# Patient Record
Sex: MALE | Race: WHITE | Employment: FULL TIME | URBAN - METROPOLITAN AREA
[De-identification: names, ages, dates, MRNs, and addresses within clinical notes are randomized per-mention and may not be internally consistent; named-entity substitution may affect disease eponyms.]

---

## 2023-01-19 ENCOUNTER — APPOINTMENT (OUTPATIENT)
Dept: GENERAL RADIOLOGY | Age: 48
DRG: 305 | End: 2023-01-19
Payer: COMMERCIAL

## 2023-01-19 ENCOUNTER — APPOINTMENT (OUTPATIENT)
Dept: CT IMAGING | Age: 48
DRG: 305 | End: 2023-01-19
Payer: COMMERCIAL

## 2023-01-19 ENCOUNTER — HOSPITAL ENCOUNTER (INPATIENT)
Age: 48
LOS: 1 days | Discharge: HOME | DRG: 305 | End: 2023-01-20
Attending: EMERGENCY MEDICINE | Admitting: INTERNAL MEDICINE
Payer: COMMERCIAL

## 2023-01-19 DIAGNOSIS — G45.9 TIA (TRANSIENT ISCHEMIC ATTACK): Primary | ICD-10-CM

## 2023-01-19 LAB
A/G RATIO: 1.6 (ref 1.1–2.2)
ALBUMIN SERPL-MCNC: 5.2 G/DL (ref 3.4–5)
ALP BLD-CCNC: 100 U/L (ref 40–129)
ALT SERPL-CCNC: 32 U/L (ref 10–40)
ANION GAP SERPL CALCULATED.3IONS-SCNC: 13 MMOL/L (ref 3–16)
AST SERPL-CCNC: 26 U/L (ref 15–37)
BASOPHILS ABSOLUTE: 0.1 K/UL (ref 0–0.2)
BASOPHILS RELATIVE PERCENT: 1.1 %
BILIRUB SERPL-MCNC: 0.4 MG/DL (ref 0–1)
BUN BLDV-MCNC: 19 MG/DL (ref 7–20)
CALCIUM SERPL-MCNC: 10.2 MG/DL (ref 8.3–10.6)
CHLORIDE BLD-SCNC: 99 MMOL/L (ref 99–110)
CO2: 26 MMOL/L (ref 21–32)
CREAT SERPL-MCNC: 1.2 MG/DL (ref 0.9–1.3)
EOSINOPHILS ABSOLUTE: 0.2 K/UL (ref 0–0.6)
EOSINOPHILS RELATIVE PERCENT: 2.2 %
GFR SERPL CREATININE-BSD FRML MDRD: >60 ML/MIN/{1.73_M2}
GLUCOSE BLD-MCNC: 112 MG/DL (ref 70–99)
HCT VFR BLD CALC: 50 % (ref 40.5–52.5)
HEMOGLOBIN: 17.6 G/DL (ref 13.5–17.5)
INR BLD: 0.92 (ref 0.87–1.14)
LYMPHOCYTES ABSOLUTE: 1.6 K/UL (ref 1–5.1)
LYMPHOCYTES RELATIVE PERCENT: 21.6 %
MCH RBC QN AUTO: 31.5 PG (ref 26–34)
MCHC RBC AUTO-ENTMCNC: 35.2 G/DL (ref 31–36)
MCV RBC AUTO: 89.5 FL (ref 80–100)
MONOCYTES ABSOLUTE: 0.5 K/UL (ref 0–1.3)
MONOCYTES RELATIVE PERCENT: 6.4 %
NEUTROPHILS ABSOLUTE: 5 K/UL (ref 1.7–7.7)
NEUTROPHILS RELATIVE PERCENT: 68.7 %
PDW BLD-RTO: 14 % (ref 12.4–15.4)
PLATELET # BLD: 245 K/UL (ref 135–450)
PMV BLD AUTO: 8.5 FL (ref 5–10.5)
POTASSIUM REFLEX MAGNESIUM: 4 MMOL/L (ref 3.5–5.1)
PRO-BNP: 24 PG/ML (ref 0–124)
PROTHROMBIN TIME: 12.3 SEC (ref 11.7–14.5)
RBC # BLD: 5.59 M/UL (ref 4.2–5.9)
SODIUM BLD-SCNC: 138 MMOL/L (ref 136–145)
TOTAL PROTEIN: 8.5 G/DL (ref 6.4–8.2)
TROPONIN: <0.01 NG/ML
WBC # BLD: 7.3 K/UL (ref 4–11)

## 2023-01-19 PROCEDURE — 93005 ELECTROCARDIOGRAM TRACING: CPT | Performed by: EMERGENCY MEDICINE

## 2023-01-19 PROCEDURE — 71045 X-RAY EXAM CHEST 1 VIEW: CPT

## 2023-01-19 PROCEDURE — 85025 COMPLETE CBC W/AUTO DIFF WBC: CPT

## 2023-01-19 PROCEDURE — 70498 CT ANGIOGRAPHY NECK: CPT

## 2023-01-19 PROCEDURE — 84484 ASSAY OF TROPONIN QUANT: CPT

## 2023-01-19 PROCEDURE — 70450 CT HEAD/BRAIN W/O DYE: CPT

## 2023-01-19 PROCEDURE — 1200000000 HC SEMI PRIVATE

## 2023-01-19 PROCEDURE — 85610 PROTHROMBIN TIME: CPT

## 2023-01-19 PROCEDURE — 80053 COMPREHEN METABOLIC PANEL: CPT

## 2023-01-19 PROCEDURE — 99285 EMERGENCY DEPT VISIT HI MDM: CPT

## 2023-01-19 PROCEDURE — 83880 ASSAY OF NATRIURETIC PEPTIDE: CPT

## 2023-01-19 PROCEDURE — 6360000004 HC RX CONTRAST MEDICATION: Performed by: EMERGENCY MEDICINE

## 2023-01-19 PROCEDURE — 6370000000 HC RX 637 (ALT 250 FOR IP): Performed by: EMERGENCY MEDICINE

## 2023-01-19 RX ORDER — ENOXAPARIN SODIUM 100 MG/ML
40 INJECTION SUBCUTANEOUS DAILY
Status: DISCONTINUED | OUTPATIENT
Start: 2023-01-20 | End: 2023-01-20 | Stop reason: HOSPADM

## 2023-01-19 RX ORDER — ASPIRIN 300 MG/1
300 SUPPOSITORY RECTAL DAILY
Status: DISCONTINUED | OUTPATIENT
Start: 2023-01-20 | End: 2023-01-20 | Stop reason: HOSPADM

## 2023-01-19 RX ORDER — LABETALOL HYDROCHLORIDE 5 MG/ML
10 INJECTION, SOLUTION INTRAVENOUS EVERY 10 MIN PRN
Status: DISCONTINUED | OUTPATIENT
Start: 2023-01-19 | End: 2023-01-20

## 2023-01-19 RX ORDER — ONDANSETRON 4 MG/1
4 TABLET, ORALLY DISINTEGRATING ORAL EVERY 8 HOURS PRN
Status: DISCONTINUED | OUTPATIENT
Start: 2023-01-19 | End: 2023-01-20 | Stop reason: HOSPADM

## 2023-01-19 RX ORDER — ONDANSETRON 2 MG/ML
4 INJECTION INTRAMUSCULAR; INTRAVENOUS EVERY 6 HOURS PRN
Status: DISCONTINUED | OUTPATIENT
Start: 2023-01-19 | End: 2023-01-20 | Stop reason: HOSPADM

## 2023-01-19 RX ORDER — ASPIRIN 81 MG/1
81 TABLET ORAL DAILY
Status: DISCONTINUED | OUTPATIENT
Start: 2023-01-20 | End: 2023-01-20 | Stop reason: HOSPADM

## 2023-01-19 RX ORDER — ASPIRIN 81 MG/1
324 TABLET, CHEWABLE ORAL ONCE
Status: COMPLETED | OUTPATIENT
Start: 2023-01-19 | End: 2023-01-19

## 2023-01-19 RX ORDER — POLYETHYLENE GLYCOL 3350 17 G/17G
17 POWDER, FOR SOLUTION ORAL DAILY PRN
Status: DISCONTINUED | OUTPATIENT
Start: 2023-01-19 | End: 2023-01-20 | Stop reason: HOSPADM

## 2023-01-19 RX ADMIN — IOPAMIDOL 75 ML: 755 INJECTION, SOLUTION INTRAVENOUS at 21:20

## 2023-01-19 RX ADMIN — ASPIRIN 324 MG: 81 TABLET, CHEWABLE ORAL at 22:09

## 2023-01-20 ENCOUNTER — APPOINTMENT (OUTPATIENT)
Dept: MRI IMAGING | Age: 48
DRG: 305 | End: 2023-01-20
Payer: COMMERCIAL

## 2023-01-20 VITALS
DIASTOLIC BLOOD PRESSURE: 103 MMHG | RESPIRATION RATE: 16 BRPM | HEART RATE: 80 BPM | BODY MASS INDEX: 29.34 KG/M2 | HEIGHT: 72 IN | OXYGEN SATURATION: 97 % | SYSTOLIC BLOOD PRESSURE: 160 MMHG | TEMPERATURE: 98.2 F | WEIGHT: 216.6 LBS

## 2023-01-20 LAB
CHOLESTEROL, TOTAL: 239 MG/DL (ref 0–199)
EKG ATRIAL RATE: 66 BPM
EKG ATRIAL RATE: 84 BPM
EKG DIAGNOSIS: NORMAL
EKG DIAGNOSIS: NORMAL
EKG P AXIS: 32 DEGREES
EKG P AXIS: 48 DEGREES
EKG P-R INTERVAL: 116 MS
EKG P-R INTERVAL: 140 MS
EKG Q-T INTERVAL: 370 MS
EKG Q-T INTERVAL: 400 MS
EKG QRS DURATION: 108 MS
EKG QRS DURATION: 92 MS
EKG QTC CALCULATION (BAZETT): 419 MS
EKG QTC CALCULATION (BAZETT): 437 MS
EKG R AXIS: -35 DEGREES
EKG R AXIS: 76 DEGREES
EKG T AXIS: -26 DEGREES
EKG T AXIS: 29 DEGREES
EKG VENTRICULAR RATE: 66 BPM
EKG VENTRICULAR RATE: 84 BPM
HDLC SERPL-MCNC: 32 MG/DL (ref 40–60)
LDL CHOLESTEROL CALCULATED: 165 MG/DL
LV EF: 63 %
LVEF MODALITY: NORMAL
TRIGL SERPL-MCNC: 210 MG/DL (ref 0–150)
VLDLC SERPL CALC-MCNC: 42 MG/DL

## 2023-01-20 PROCEDURE — 92610 EVALUATE SWALLOWING FUNCTION: CPT

## 2023-01-20 PROCEDURE — 93010 ELECTROCARDIOGRAM REPORT: CPT | Performed by: INTERNAL MEDICINE

## 2023-01-20 PROCEDURE — 93306 TTE W/DOPPLER COMPLETE: CPT

## 2023-01-20 PROCEDURE — 6370000000 HC RX 637 (ALT 250 FOR IP): Performed by: INTERNAL MEDICINE

## 2023-01-20 PROCEDURE — 97161 PT EVAL LOW COMPLEX 20 MIN: CPT

## 2023-01-20 PROCEDURE — G0378 HOSPITAL OBSERVATION PER HR: HCPCS

## 2023-01-20 PROCEDURE — 97165 OT EVAL LOW COMPLEX 30 MIN: CPT

## 2023-01-20 PROCEDURE — 36415 COLL VENOUS BLD VENIPUNCTURE: CPT

## 2023-01-20 PROCEDURE — APPNB45 APP NON BILLABLE 31-45 MINUTES

## 2023-01-20 PROCEDURE — 6360000002 HC RX W HCPCS: Performed by: INTERNAL MEDICINE

## 2023-01-20 PROCEDURE — 70551 MRI BRAIN STEM W/O DYE: CPT

## 2023-01-20 PROCEDURE — 96372 THER/PROPH/DIAG INJ SC/IM: CPT

## 2023-01-20 PROCEDURE — 93005 ELECTROCARDIOGRAM TRACING: CPT | Performed by: INTERNAL MEDICINE

## 2023-01-20 PROCEDURE — 80061 LIPID PANEL: CPT

## 2023-01-20 PROCEDURE — 83036 HEMOGLOBIN GLYCOSYLATED A1C: CPT

## 2023-01-20 RX ORDER — LISINOPRIL 10 MG/1
10 TABLET ORAL DAILY
Qty: 30 TABLET | Refills: 3 | Status: SHIPPED | OUTPATIENT
Start: 2023-01-21

## 2023-01-20 RX ORDER — LABETALOL HYDROCHLORIDE 5 MG/ML
10 INJECTION, SOLUTION INTRAVENOUS EVERY 4 HOURS PRN
Status: DISCONTINUED | OUTPATIENT
Start: 2023-01-20 | End: 2023-01-20 | Stop reason: HOSPADM

## 2023-01-20 RX ORDER — ATORVASTATIN CALCIUM 40 MG/1
40 TABLET, FILM COATED ORAL NIGHTLY
Qty: 30 TABLET | Refills: 3 | Status: SHIPPED | OUTPATIENT
Start: 2023-01-20

## 2023-01-20 RX ORDER — ATORVASTATIN CALCIUM 80 MG/1
80 TABLET, FILM COATED ORAL NIGHTLY
Status: DISCONTINUED | OUTPATIENT
Start: 2023-01-20 | End: 2023-01-20 | Stop reason: HOSPADM

## 2023-01-20 RX ORDER — ASPIRIN 81 MG/1
81 TABLET ORAL DAILY
Qty: 30 TABLET | Refills: 3 | Status: SHIPPED | OUTPATIENT
Start: 2023-01-21

## 2023-01-20 RX ORDER — ATORVASTATIN CALCIUM 40 MG/1
40 TABLET, FILM COATED ORAL NIGHTLY
Status: DISCONTINUED | OUTPATIENT
Start: 2023-01-20 | End: 2023-01-20

## 2023-01-20 RX ORDER — LISINOPRIL 10 MG/1
10 TABLET ORAL DAILY
Status: DISCONTINUED | OUTPATIENT
Start: 2023-01-20 | End: 2023-01-20 | Stop reason: HOSPADM

## 2023-01-20 RX ADMIN — LISINOPRIL 10 MG: 10 TABLET ORAL at 10:29

## 2023-01-20 RX ADMIN — ENOXAPARIN SODIUM 40 MG: 100 INJECTION SUBCUTANEOUS at 10:25

## 2023-01-20 RX ADMIN — ASPIRIN 81 MG: 81 TABLET, COATED ORAL at 10:25

## 2023-01-20 NOTE — DISCHARGE INSTRUCTIONS
Secondary Stroke Prevention Goals:      Blood Pressure:  Goal - BP < 140/90  Take your medication as prescribed. Take your blood pressure at home regularly (at least once a day for the first few weeks). Write the numbers down so your primary care provider can adjust medications as needed. Cholesterol:   Continue to take your cholesterol medication to help prevent a stroke. Smoking Cessation   If you are a current smoker the goal is completely quitting. Diabetes Mellitus:   Goal - HbA1c < 7%  Visit this web page for more information on managing diabetes:   ElectionBooks[employer]fi     Alcohol Consumption:  Men - two or fewer drinks per day  Women - one or fewer drinks per day     Physical Activity:  Goal - at least 30 minutes of moderate intensity physical exercise 1-3 times per week  Okay to start at any level and work your way up to goal. Walking even 5-10 minutes a day is better than no walking. Starting with small goals and working your way up is the best way to be successful. Visit this web page for more information on moving more and living healthy:   Rajinder.es     Diet:  Low-fat, low-sodium, and Mediterranean or Dietary Approaches to Stop Hypertension (DASH) or Diabetic Diet when applicable.    Visit this web page for more information on ways to improve your diet:   http://www.Clout.com/     Obesity:  Goal BMI 18.5-25 kg/m2

## 2023-01-20 NOTE — PROGRESS NOTES
Occupational Therapy  Facility/Department: [facility]  Occupational Therapy Initial Assessment    Name: Loretta Khan  : 1975  MRN: [id number]  Date of Service: 2023    Discharge Recommendations:     OT Equipment Recommendations  Equipment Needed: No   Loretta Khan scored a 22/24 on the AM-[facility] ADL Inpatient form. At this time, no further OT is recommended upon discharge. Recommend patient returns to prior setting with prior services. Patient Diagnosis(es): The encounter diagnosis was TIA (transient ischemic attack). Past Medical History:  has no past medical history on file. Past Surgical History:  has no past surgical history on file. Treatment Diagnosis: decreased ADLs and transfers      Assessment   Performance deficits / Impairments: Decreased functional mobility ; Decreased high-level IADLs;Decreased endurance  Assessment: Prior to admission pt was living at home with his wife, was independent with ADLs and IADLs. Pt now presents s/p dysarthria, however reporting symptoms have now resolved. Pt demonstrating independent with mobility and transfers. Pt plans to discharge home with wife to assist. No further acute OT needs are identified, will sign off on OT. Treatment Diagnosis: decreased ADLs and transfers  Prognosis: Fair  Decision Making: Medium Complexity  REQUIRES OT FOLLOW-UP: No  Activity Tolerance  Activity Tolerance: Patient Tolerated treatment well        Plan   Occupational Therapy Plan  Times Per Week: eval and dc     Restrictions  Position Activity Restriction  Other position/activity restrictions: up as tolerated    Subjective   General  Chart Reviewed: Yes  Patient assessed for rehabilitation services?: Yes  Additional Pertinent Hx: Pt admitted to ED with c/o dysarthria. MRI: no acute findings. Minimal punctate subcortical white matter signal abnormalities in the frontal lobes.  These are nonspecific and commonly associated with minimal chronic small vessel ischemic disease and some short minimal age-related degenerative change. Pts symptoms had resolved after admitting to ED, no TKN was given. Being followed by Neuro. No medical history on file. Family / Caregiver Present: No  Referring Practitioner: Ramón Knox MD  Diagnosis: TIA  Subjective  Subjective: Pt seated in bed upon arrival, agreeable to OT eval and treat. pt denies  Social/Functional History  Social/Functional History  Lives With: Spouse  Type of Home: House  Home Layout: Two level  Home Access: Stairs to enter with rails  Entrance Stairs - Number of Steps: 4  Entrance Stairs - Rails: Both  Bathroom Shower/Tub: Tub/Shower unit  Bathroom Toilet: Standard  Bathroom Equipment: None  Home Equipment:  (no AE)  ADL Assistance: Independent  Homemaking Assistance: Independent  Ambulation Assistance: Independent  Transfer Assistance: Independent  Active : Yes  Type of Occupation: pt works in   Additional Comments: Wife available to assist PRN       Objective              Safety Devices  Type of Devices: Call light within reach; Bed alarm in place  Bed Mobility Training  Bed Mobility Training: Yes  Sit to Supine: Independent  Scooting: Independent  Balance  Sitting: Intact  Standing: Intact  Transfer Training  Transfer Training: Yes  Sit to Stand: Independent  Stand to Sit: Independent  Gait Training  Gait Training: Yes  Gait  Overall Level of Assistance: Independent (pt amb 400 ft without device IND. steady with no LOB.)  Rail Use:  (no rail for ascecnt, right rail for descent)  Stairs - Level of Assistance: Independent  Number of Stairs Trained: 10        ADL  LE Dressing: Supervision  LE Dressing Skilled Clinical Factors: standing on one foot to thread LEs- ed pt to sit to don socks and pants- verb understanding     Activity Tolerance  Activity Tolerance: Patient tolerated evaluation without incident     Transfers  Sit to stand: Independent  Stand to sit:  Independent  Transfer Comments: mobility in room and hallway independently, no LOB. No AE. Up/ down stairs independently  Vision  Vision: Within Functional Limits  Hearing  Hearing: Within functional limits  Cognition  Overall Cognitive Status: WFL  Orientation  Overall Orientation Status: Within Functional Limits                  Education Given To: Patient  Education Provided: Role of Therapy;Plan of Care  Education Method: Verbal  Barriers to Learning: None  Education Outcome: Verbalized understanding                     AM-PAC Score        AM-PAC Inpatient Daily Activity Raw Score: 22 (01/20/23 1107)  AM-PAC Inpatient ADL T-Scale Score : 47.1 (01/20/23 1107)  ADL Inpatient CMS 0-100% Score: 25.8 (01/20/23 1107)  ADL Inpatient CMS G-Code Modifier : Gearl Tho (01/20/23 1107)         Goals  Patient Goals   Patient goals : go home       Therapy Time   Individual Concurrent Group Co-treatment   Time In 0840         Time Out 0855         Minutes 15         Timed Code Treatment Minutes: 0 Minutes (15 min eval only)   Geraldine Juarez.  [address] [facility], OTR/L [id number]

## 2023-01-20 NOTE — PROGRESS NOTES
Speech Language Pathology  Facility/Department: [facility] 5T ORTHO/NEURO   CLINICAL BEDSIDE SWALLOW EVALUATION/Discharge    NAME: Kateryna Pang  : 1975  MRN: [id number]    ADMISSION DATE: 2023  ADMITTING DIAGNOSIS: has TIA (transient ischemic attack) on their problem list.  ONSET DATE: 23    Recent Chest Xray/CT of Chest:   Impression       No acute pulmonary disease. MRI Brain:   Impression       1. No acute intracranial abnormality. No evidence of acute infarct. 2. Minimal punctate subcortical white matter signal abnormalities in the frontal lobes. These are nonspecific and commonly associated with minimal chronic small vessel ischemic disease and some short minimal age-related degenerative change. Similar    findings are also be seen with migraine headaches. Date of Eval: 2023  Evaluating Therapist: LUANNE Toney    Current Diet level:  Current Diet : Regular      Primary Complaint  Patient Complaint: none    Pain:  Pain Assessment  Pain Assessment: None - Denies Pain    Reason for Referral  Kateryna Pang was referred for a bedside swallow evaluation to assess the efficiency of his swallow function, identify signs and symptoms of aspiration and make recommendations regarding safe dietary consistencies, effective compensatory strategies, and safe eating environment. Impression  Dysphagia Diagnosis: Swallow function appears WFL  Dysphagia Impression : Pt presents with swallow function WFL. Pt seated upright in bed and readily self fed all trials of PO. Pt demonstrated timely and complete mastication with no oral residue remaining. Pt appeared to swallow all trials with no overt s/s penetration/aspiration. No wet vocal quality and no reported sensation globus. Based on assessment SLP recommends continue current diet of Regular Solids and Thin Liquids. No further SLP services warranted at this time.  Please re-consult as needed/appropriate. Dysphagia Outcome Severity Scale: Level 7: Normal in all situations     Speech Screen:  Pt with initial difficulty with word finding skills upon admission, however since have resolved and no complaints at this time. Adequate speech/language/cognitive skills noted during assessment. Treatment Plan  Requires SLP Intervention: No     D/C Recommendations: No follow up therapy recommended post discharge       Recommended Diet and Intervention  Recommended Diet: Regular Solids, Thin Liquids  Recommended Form of Meds: PO     Compensatory Swallowing Strategies  Compensatory Swallowing Strategies : Upright as possible for all oral intake    General  Chart Reviewed: Yes  Subjective  Subjective: Pt seated upright in bed agreeable to evaluation at this time. Pt's wife present. Behavior/Cognition: Alert; Cooperative;Pleasant mood  Respiratory Status: Room air  O2 Device: None (Room air)  Communication Observation: Functional  Follows Directions: Complex  Dentition: Adequate  Patient Positioning: Upright in bed  Baseline Vocal Quality: Normal  Volitional Cough: Strong  Prior Dysphagia History: none  Consistencies Administered: Ice Chips; Thin - straw; Thin - cup;Pureed;Regular      Vision/Hearing  Vision  Vision: Within Functional Limits  Hearing  Hearing: Within functional limits    Oral Motor Deficits  Oral/Motor  Oral Hygiene: Moist;Clean    Oral Phase Dysfunction  Oral Phase  Oral Phase: WNL     Indicators of Pharyngeal Phase Dysfunction   Pharyngeal Phase   Pharyngeal Phase: Presumed WFL    Prognosis  Individuals consulted  Consulted and agree with results and recommendations: Patient;RN  RN Name: Charis Shaffer    Education  Patient Education: Discussed rationale for evaluation, results and recommendations. Patient Education Response: Verbalizes understanding;Demonstrated understanding  Safety Devices in place: Yes  Type of devices:  All fall risk precautions in place       Pt's goal:  To go home    Plan:  Discharge from SLP services. No further intervention required at this time. Please re-consult as needed/appropriate and/or if s/s penetration/aspiration emerge. Recommended diet: Regular Solids, Thin Liquids  -meds PO  Discharge Plan: To be determined closer to discharge  Discussed with RN: Adrianne Fuentes  Needs within reach.        Therapy Time  SLP Individual Minutes  Time In: 0900  Time Out: 3205  Minutes: 15     SLP Total Treatment Time  Total Treatment Time: 15      Electronically signed by:  Julee Newman M.A., [address] [place] [address]  Speech-Language Pathologist  Pg #: [phone]

## 2023-01-20 NOTE — PROGRESS NOTES
Transferred to 5526 @ [phone] 23    Aox4  No difficulty with words or aphasia    NIHSS score 0  No deficits, no one sided weakness    Ambulatory without assistive devices    Voiding    Passed swallow screen    RA  Clear lung sounds    NSR on tele    MRI checklist complete, pt will get MRI tonight    Stroke education provided to pt and spouse    Standard precautions implemented, will continue to monitor

## 2023-01-20 NOTE — PROGRESS NOTES
Clinical Pharmacy Progress Note    All IVs in NS - Management by Pharmacy    Consult Date(s): 1/19/23  Consulting Provider(s): Dr America Faustin / Plan  Concern for Neurologic Injury - All IVs in Normal Saline  Drips will be adjusted to normal saline as appropriate based on compatibility, in an effort to avoid fluid shifts, as D5W is osmotically active. The following intermittent IV drips / infusions have been adjusted to saline:  None ordered at this time. If ordered, the following medications must remain in D5W due to incompatibility with normal saline:  Amiodarone Infusion  Amphotericin  Mycophenolate  Nitroprusside  Penicillin G Potassium  Note: Patient may have dextrose ordered as part of hypoglycemia treatment protocol. Total IV fluid delivered to patient over last 24 hrs: N/A  Pharmacist will follow daily to ensure all IVPBs / drips are in NS where possible. Thank you for consulting Pharmacy!     Maicol Hester, PharmD, [facility]  [id number]  01/20/23 9:03 AM

## 2023-01-20 NOTE — CONSULTS
Neurology / Neurocritical Care Consult Note    Nori Yao MD is requesting this consult. Reason for Consult: word finding difficulty and hypertensive urgency  Admission Chief Complaint: word finding difficulty    History of Present Illness     Kal Benavides is a 41 y.o. y/o male with PMH significant for smoking (30 pack years) HLD and HTN, but does not take any medications and has not seen his PCP in 10 years. Per my interview with the patient he was at work and noticed he was having trouble talking to one of his co-workers at [employer] on 1/19/23. As the patient continued to converse with his co-workers the group noticed his speech became worse. Patient states he could understand other people, and knew what he wanted to say, but could not get the words out. Patient came to [facility] ED for evaluation and treatment. On arrival to the ED, patient's symptoms began to lessen. A code stroke was called CT head no acute intracranial abnormality. CTA head/neck showed no aneurysms, occlusions, or significant stenosis.  stroke team recommended against TNK, and allow BP autoregulation overnight. Patient admitted to  for further evaluation and treatment. REVIEW OF SYSTEMS:   Constitutional- No weight loss or fevers   Eyes- No diplopia. No photophobia. Ears/nose/throat- No dysphagia. No Dysarthria   Cardiovascular- No palpitations. No chest pain   Respiratory- No dyspnea. No Cough   Gastrointestinal- No Abdominal pain. No Vomiting. Genitourinary- No incontinence. No urinary retention   Musculoskeletal- No myalgia. No arthralgia   Skin- No rash. No easy bruising. Psychiatric- No depression. No anxiety   Endocrine- No diabetes. No thyroid issues. Hematologic- No bleeding difficulty. No fatigue   Neurologic- No headaches. No vision changes. No focal motor/sensory deficits.      Past Medical, Surgical, Family, and Social History   PAST MEDICAL HISTORY:  No past medical history on file.  SURGICAL HISTORY:  No past surgical history on file. FAMILY HISTORY & SOCIAL HISTORY:  Family history non-contributory  No family history on file. Social History     Tobacco Use    Smoking status: Every Day     Packs/day: 1.00     Types: Cigarettes    Smokeless tobacco: Never   Substance Use Topics    Alcohol use: Yes     Comment: occ    Drug use: Never         Allergies & Outpatient Medications   ALLERGIES:  Allergies   Allergen Reactions    Pcn [Penicillins] Anaphylaxis     HOME MEDICATIONS:  There are no discharge medications for this patient. Physical Exam   PHYSICAL EXAM:  Vitals:    01/19/23 2230 01/19/23 2300 01/19/23 2341 01/20/23 0207   BP: (!) 186/118 (!) 179/110 (!) 185/116 (!) 170/107   Pulse: 90 80 84 80   Resp: 16 21 18 16   Temp:   98.1 °F (36.7 °C) 98.2 °F (36.8 °C)   TempSrc:   Oral Oral   SpO2: 97% 96% 98% 95%   Weight:       Height:             General: Alert, no distress, well-nourished  Neurologic  Mental status:   orientation to person, place, time, situation   Attention intact as able to attend well to the exam     Language fluent in conversation   Comprehension intact; follows simple commands    Cranial nerves:   CN2: Visual fields full w/o extinction on confrontational testing   CN 3,4,6: Pupils equal and reactive to light, extraocular muscles intact  CN5: Facial sensation symmetric   CN7: Face symmetric  CN8: Hearing symmetric to spoken voice  CN9: Palate elevated symmetrically  CN11: Traps full strength on shoulder shrug  CN12: Tongue midline with protrusion    Motor Exam:   R  L    Deltoid 5  5   Biceps 5 5   Triceps 5 5   Wrist extension  5 5   Interossei 5 5      R  L    Hip flexion  5  5   Hip extension  5 5   Knee flexion  5 5   Knee extension  5 5   Ankle dorsiflexion  5 5   Ankle plantar flexion  5 5     Deep tendon reflexes:    R  L    Biceps  2  2   Brachioradialis  2 2   Patellar  2 2     Sensory: light touch intact and symmetric in all 4 extremities.   No sensory extinction on bilateral simultaneous stimulation  Cerebellar/coordination: finger nose finger normal without ataxia  Tone: normal in all 4 extremities  Gait: normal      OTHER SYSTEMS:  Cardiovascular: Warm, appears well perfused   Respiratory: Easy, non-labored respiratory pattern   Abdominal: Abdomen is without distention   Extremities: Upper and lower extremities are atraumatic in appearance without deformity. No swelling or erythema. Psychiatric: Cooperative with exam    Diagnostic Testing Results   IMAGES:  Images personally reviewed and agree w/ radiology interpretation. Head CT w/o Contrast:  Impression   1. No acute intracranial abnormality by CT criteria. 2.  Borderline low lying cerebral tonsils not meeting criteria for Chiari malformation. CTA of Head / Neck w/ Contrast:  Impression   1. No aneurysms, vascular occlusions, or intracranial stenoses identified. 2.  No significant stenosis in the extracranial vertebral or carotid arteries. MRI Brain w/o Contrast:  Official read pending. Initial review by this NP shows no signs of stroke or other intracranial abnormality. LABS:  All results below personally reviewed. Pertinent positives & negatives are addressed in Impression & Recommendations below. LABS   Metabolic Panel Recent Labs     01/19/23  2109      K 4.0   CL 99   CO2 26   BUN 19   CREATININE 1.2   GLUCOSE 112*   CALCIUM 10.2   LABALBU 5.2*   ALKPHOS 100   ALT 32   AST 26      CBC / Coags Recent Labs     01/19/23  2109   WBC 7.3   RBC 5.59   HGB 17.6*   HCT 50.0      INR 0.92      Other No results for input(s): LABA1C, LDLCALC, TRIG, TSH, [id number], FOLATE, LABSALI, COVID19 in the last 72 hours. No results for input(s): PHENYTOIN, KEPPRA, LACOSA, LAMO, VALPROATE, LACTSEPSIS, LACTA in the last 72 hours.        CURRENT SCHEDULED MEDICATIONS   Inpatient Medications     enoxaparin, 40 mg, SubCUTAneous, Daily    aspirin, 81 mg, Oral, Daily **OR** aspirin, 300 mg, Rectal, Daily   Infusions      Antibiotics   Recent Abx Admin        No antibiotic orders with administrations found. IMPRESSION & RECOMMENDATIONS     IMPRESSION:  Anuja Singletary is a 42 yo male who presented to [facility] ED for expressive aphasia. Patient's symptoms began to resolve in the ED and on exam, patient had no aphasia or other neurological deficit. CT head and CTA head/neck showed no acute intracranial abnormality.   MRI shows no stroke or other intracranial abnormality on review by this NP.       RECOMMENDATIONS:  -MRI of the brain w/o contrast to eval for stroke completed  -Check lipid panel and hemoglobin A1C if not already completed     Medications:  -High-intensity statin   -Start ASA 81mg   -SCDs for DVT prophylaxis  -Ok to start DVT chemoprophylaxis     Consults / [address] elevated to help prevent aspiration  -Q4H Neurologic Exams & Vitals  -NIHSS per guidelines   -Telemetry   -PT/OT: eval and treat  -PMR consult if rehab recommended   -ST: eval and treat and dysphagia screen  -Nursing bedside swallow prior to any PO intake   -Blood Pressure Management   -SBP <160mmHg    Follow up / Discharge Recommendations:  -Stroke Education at Discharge    -Follow up w/ Neurology in 3 months         Angele Aschoff, APRN - CNP   Neurology & Neurocritical Care   1/20/2023 5:21 AM      Floor / PCU Patients:  Neurology Line: [phone]  PerfectServe: [facility] Neurology

## 2023-01-20 NOTE — PROGRESS NOTES
Physical Therapy  Facility/Department: [facility]  Physical Therapy Initial Assessment/Discharge    Name: Floyd Gant  : 1975  MRN: [id number]  Date of Service: 2023    Discharge Recommendations:    Floyd Gant scored a 24/24 on the AM-PAC short mobility form. At this time, no further PT is recommended upon discharge. Recommend patient returns to prior setting with prior services. PT Equipment Recommendations  Equipment Needed: No      Patient Diagnosis(es): The encounter diagnosis was TIA (transient ischemic attack). Past Medical History:  has no past medical history on file. Past Surgical History:  has no past surgical history on file. Assessment   Assessment: Pt is independent with all functional mobility. Safe to go home upon D/C. No further inpt PT indicated. D/C PT. Decision Making: Low Complexity  Requires PT Follow-Up: No  Activity Tolerance  Activity Tolerance: Patient tolerated evaluation without incident     Plan   Physcial Therapy Plan  General Plan: Discharge with evaluation only  Safety Devices  Type of Devices: Call light within reach, Bed alarm in place, Left in bed     Restrictions  Position Activity Restriction  Other position/activity restrictions: up as tolerated     Subjective   Pain: pt denies  General  Chart Reviewed: Yes  Patient assessed for rehabilitation services?: Yes  Additional Pertinent Hx: Floyd Gant is a 41 y.o. male who presents to the emergency department with difficulty with speech. Now resolved. Family / Caregiver Present: Yes (wife)  Referring Practitioner: Adarsh Vasquez  Referral Date : 23  Follows Commands: Within Functional Limits  Subjective  Subjective: Pt supine in bed & agreeable to PT.          Social/Functional History  Social/Functional History  Lives With: Spouse  Type of Home: House  Home Layout: Two level  Home Access: Stairs to enter with rails  Entrance Stairs - Number of Steps: 4  Entrance Stairs - Rails: Both  Bathroom Shower/Tub: Tub/Shower unit  Bathroom Toilet: Standard  Bathroom Equipment: None  Home Equipment:  (no AE)  ADL Assistance: Independent  Homemaking Assistance: Independent  Ambulation Assistance: Independent  Transfer Assistance: Independent  Active : Yes  Type of Occupation: pt works in   Additional Comments: Wife available to assist PRN  Vision/Hearing  Vision  Vision: Within Functional Limits  Hearing  Hearing: Within functional limits    Cognition   Orientation  Overall Orientation Status: Within Functional Limits  Cognition  Overall Cognitive Status: WFL     Objective   Heart Rate: 80  Heart Rate Source: Monitor  BP: (!) 160/103  BP Location: Right upper arm  BP Method: Automatic  Patient Position: Semi fowlers  MAP (Calculated): 122  Resp: 16  SpO2: 97 %  O2 Device: None (Room air)              AROM RLE (degrees)  RLE AROM: WNL  AROM LLE (degrees)  LLE AROM : WNL  Strength RLE  Strength RLE: WFL  Strength LLE  Strength LLE: WFL        Bed Mobility Training  Bed Mobility Training: Yes  Sit to Supine: Independent  Scooting: Independent  Balance  Sitting: Intact  Standing: Intact  Transfer Training  Transfer Training: Yes  Sit to Stand: Independent  Stand to Sit: Independent  Gait Training  Gait Training: Yes  Gait  Overall Level of Assistance: Independent (pt amb 400 ft without device IND.  steady with no LOB.)  Rail Use:  (no rail for ascecnt, right rail for descent)  Stairs - Level of Assistance: Independent  Number of Stairs Trained: 10                                                                      AM-PAC Score  AM-PAC Inpatient Mobility Raw Score : 24 (01/20/23 1053)  AM-PAC Inpatient T-Scale Score : 61.14 (01/20/23 1053)  Mobility Inpatient CMS 0-100% Score: 0 (01/20/23 1053)  Mobility Inpatient CMS G-Code Modifier : [address] (01/20/23 1053)                 Education  Patient Education  Education Given To: Patient  Education Provided: Role of Therapy  Education Method: Verbal  Barriers to Learning: None  Education Outcome: Verbalized understanding      Therapy Time   Individual Concurrent Group Co-treatment   Time In 0840         Time Out 0855         Minutes Yolis 9082, PT

## 2023-01-20 NOTE — DISCHARGE SUMMARY
Hospital Medicine Discharge Summary    Patient ID: Ayla Mahmood      Patient's PCP: Akin Richey Petit Date: 1/19/2023     Discharge Date:   1/20/2023    Admitting Provider: Yadira Thompson MD     Discharge Provider: Loma Lennox, MD     Discharge Diagnoses: Transient ischemic attack       Active Hospital Problems    Diagnosis     TIA (transient ischemic attack) [G45.9]      Priority: Medium       The patient was seen and examined on day of discharge and this discharge summary is in conjunction with any daily progress note from day of discharge. Hospital Course: 47-year-old  male with medical history significant for active smoking, hyperlipidemia and hypertension who was was admitted with expressive aphasia 1/19/2023. His CT his brain and CTA head and neck were unremarkable.  stroke team recommended against TNK. His MRI brain was also unremarkable. He has a history of hypertension but he was not taking any medication. The detailed problems based assessment and plan on the day of discharge was follow:     1. TIA with expressive aphasia. Symptoms lasted for about 2 hours. CT head and CTA head and neck's without any acute abnormalities. MRI brain was also unremarkable. Appreciate neurology input. Continue aspirin 81 mg and Lipitor 40 mg. Follow-up lipid panel and A1c. Transthoracic echo was normal.   Consider Even Monitor placement as out patient   Neurology are planning to follow up in 3 months      2. Hypertension, noncompliant  Start lisinopril 10 mg daily. Recommended to check blood pressure at home and follow-up with PCP. 3.  Occasional PVC  Repeat EKG with LVH and PVCs  Transthoracic echo was normal      4. History of hyperlipidemia  Follow-up lipid panel    5. Tobacco dependency  Reports smoking 1 pack/day. Counseled for cessation. Offered nicotine replacement but declined.          Physical Exam Performed:     BP (!) 160/103   Pulse 80   Temp 98.2 °F (36.8 °C) (Oral)   Resp 16   Ht 6' (1.829 m)   Wt 216 lb 9.6 oz (98.2 kg)   SpO2 97%   BMI 29.38 kg/m²       General appearance:  No apparent distress, appears stated age and cooperative. HEENT:  Normal cephalic, atraumatic without obvious deformity. Pupils equal, round, and reactive to light. Extra ocular muscles intact. Conjunctivae/corneas clear. Neck: Supple, with full range of motion. No jugular venous distention. Trachea midline. Respiratory:  Normal respiratory effort. Clear to auscultation, bilaterally without Rales/Wheezes/Rhonchi. Cardiovascular:  Regular rate and rhythm with normal S1/S2 without murmurs, rubs or gallops. Abdomen: Soft, non-tender, non-distended with normal bowel sounds. Musculoskeletal:  No clubbing, cyanosis or edema bilaterally. Full range of motion without deformity. Skin: Skin color, texture, turgor normal.  No rashes or lesions. Neurologic:  Neurovascularly intact without any focal sensory/motor deficits. Cranial nerves: II-XII intact, grossly non-focal.  Psychiatric:  Alert and oriented, thought content appropriate, normal insight  Capillary Refill: Brisk,< 3 seconds   Peripheral Pulses: +2 palpable, equal bilaterally       Labs: For convenience and continuity at follow-up the following most recent labs are provided:      CBC:    Lab Results   Component Value Date/Time    WBC 7.3 01/19/2023 09:09 PM    HGB 17.6 01/19/2023 09:09 PM    HCT 50.0 01/19/2023 09:09 PM     01/19/2023 09:09 PM       Renal:    Lab Results   Component Value Date/Time     01/19/2023 09:09 PM    K 4.0 01/19/2023 09:09 PM    CL 99 01/19/2023 09:09 PM    CO2 26 01/19/2023 09:09 PM    BUN 19 01/19/2023 09:09 PM    CREATININE 1.2 01/19/2023 09:09 PM    CALCIUM 10.2 01/19/2023 09:09 PM         Significant Diagnostic Studies    Radiology:   MRI brain without contrast   Final Result      1. No acute intracranial abnormality. No evidence of acute infarct.       2. Minimal punctate subcortical white matter signal abnormalities in the frontal lobes. These are nonspecific and commonly associated with minimal chronic small vessel ischemic disease and some short minimal age-related degenerative change. Similar    findings are also be seen with migraine headaches. CTA HEAD NECK W CONTRAST   Final Result      1. No aneurysms, vascular occlusions, or intracranial stenoses identified. 2.  No significant stenosis in the extracranial vertebral or carotid arteries. CT HEAD WO CONTRAST   Final Result      1. No acute intracranial abnormality by CT criteria. 2.  Borderline low lying cerebral tonsils not meeting criteria for Chiari malformation. Discussed with Dr. Dennise Coffman at 9:24 PM.      XR CHEST PORTABLE   Final Result      No acute pulmonary disease. Consults:     IP CONSULT TO PHARMACY  PHARMACY TO CHANGE BASE FLUIDS  IP CONSULT TO STROKE TEAM  IP CONSULT TO HOSPITALIST  IP CONSULT TO NEUROLOGY    Disposition:  home     Condition at Discharge: Stable    Discharge Instructions/Follow-up:  Follow up with PCP to check BP,follow up on lipid panel and A1C . Consider Event Monitor placement as outpatient   Follow up with neurology     Code Status:  Full Code     Activity: activity as tolerated    Diet: low fat, low cholesterol diet      Discharge Medications:     Current Discharge Medication List             Details   aspirin 81 MG EC tablet Take 1 tablet by mouth daily  Qty: 30 tablet, Refills: 3      atorvastatin (LIPITOR) 40 MG tablet Take 1 tablet by mouth nightly  Qty: 30 tablet, Refills: 3      lisinopril (PRINIVIL;ZESTRIL) 10 MG tablet Take 1 tablet by mouth daily  Qty: 30 tablet, Refills: 3             Time Spent on discharge: 34 in the examination, evaluation, counseling and review of medications and discharge plan. Signed:    Goldy Gonzalez MD   1/20/2023      Thank you DANNY Caceres for the opportunity to be involved in this patient's care.  If you have any questions or concerns, please feel free to contact me at [phone].

## 2023-01-20 NOTE — ED PROVIDER NOTES
[address] [facility] ATTENDING NOTE       Date of evaluation: 1/19/2023    Chief Complaint     Aphasia (Patient comes to the ED today for difficulty in his speech. Patient was at work when he noticed that his speech was abnormal. )      History of Present Illness     Robert Silva is a 41 y.o. male who presents to the emergency department with difficulty with speech. Patient has no documented past medical history though he states that he was told once he has hypertension and high cholesterol currently not on any medication for either. He now presents with difficulty with speech and word finding. This occurred at approximately 7 PM (1 hour and 45 minutes prior to arrival). Patient states he was at work talking to coworkers when he started to have difficulty saying common words and finding the right words to describe very common tasks for his work. He denies any headache, changes in vision or difficulty swallowing. No weakness in his extremities or change in sensation in extremities. No recent falls or trauma. Patient is not on any anticoagulation. Patient otherwise denies any recent fever, cough, shortness of breath, chest pain, abdominal pain, nausea, vomiting or changes in bladder or bowel function. Review of Systems     Positive: Word finding difficulty, slurred speech    Negative: Fever, cough, shortness of breath, chest pain, abdominal pain, nausea, vomiting, change in taste, change in smell, changes in bladder or bowel function  See HPI. A complete review of systems wasconducted and is otherwise negative unless noted above. Past Medical, Surgical, Family, and Social History     He has no past medical history on file. He has no past surgical history on file. His family history is not on file. He reports that he has been smoking cigarettes. He has been smoking an average of 1 pack per day.  He has never used smokeless tobacco. He reports current alcohol use. He reports that he does not use drugs. Medications     Previous Medications    No medications on file       Allergies     He is allergic to pcn [penicillins]. Physical Exam     INITIAL VITALS: BP: (!) 229/126, Temp: 98.2 °F (36.8 °C), Heart Rate: (!) 104, Resp: 18, SpO2: 100 %   Physical Exam    General:  Well developed adult male in no acute distress, able toconverse in full sentences  HEENT:  Normocephalic, atraumatic, PERRL, extra-ocular movements intact, oropharynx benign  Neck:  Supple, no lymphadenopathy, trachea midline, no masses  Pulmonary:   Clear to auscultation bilaterally, good air movement, no wheezes  Cardiac:  Regular rate and rhythm, no M/R/G  Abdomen:  Soft, non-tender, non-distended, no rebounding or guarding  Musculoskeletal:  2+ pulses, no edema or clubbing  Skin:  No concerning rashes or lesions, no cyanosis  Neuro: Alert and oriented X 4,able to move all four extremities with equal strength bilaterally, sensory exam grossly intact, cranial nerves II-XII intact other than occasional word finding difficulty and slurred speech though not present for its entirety  Psych:  Appropriate mood and affect    NIH Stroke Scale  Interval: Baseline  Level of Consciousness (1a): Alert  LOC Questions (1b): Answers both correctly  LOC Commands (1c): Performs both tasks correctly  Best Gaze (2): Normal  Visual (3): No visual loss  Facial Palsy (4): Normal symmetrical movement  Motor Arm, Left (5a): No drift  Motor Arm, Right (5b): No drift  Motor Leg, Left (6a): No drift  Motor Leg, Right (6b):  No drift  Limb Ataxia (7): Absent  Sensory (8): Normal  Best Language (9): No aphasia  Dysarthria (10): Mild to moderate, slurs some words (Patient has couple sputtering of words. )  Extinction and Inattention (11): No abnormality  Total: 1      Diagnostic Results     EKG   EKG performed in setting of stroke evaluation shows normal sinus rhythm with ventricular rate of 84 bpm.  Normal DE interval, QRS duration and QTC. There is left axis deviation as well as voltage criteria for LVH. No ST segment changes consistent with STEMI. No previous EKG available for direct comparison. Final interpretation is normal sinus rhythm with no acute signs of STEMI or dysrhythmia. RADIOLOGY:  CTA HEAD NECK W CONTRAST   Final Result      1. No aneurysms, vascular occlusions, or intracranial stenoses identified. 2.  No significant stenosis in the extracranial vertebral or carotid arteries. CT HEAD WO CONTRAST   Final Result      1. No acute intracranial abnormality by CT criteria. 2.  Borderline low lying cerebral tonsils not meeting criteria for Chiari malformation. Discussed with Dr. Caleb Ledbetter at 9:24 PM.      XR CHEST PORTABLE   Final Result      No acute pulmonary disease.              LABS:   Results for orders placed or performed during the hospital encounter of 01/19/23   CBC with Auto Differential   Result Value Ref Range    WBC 7.3 4.0 - 11.0 K/uL    RBC 5.59 4.20 - 5.90 M/uL    Hemoglobin 17.6 (H) 13.5 - 17.5 g/dL    Hematocrit 50.0 40.5 - 52.5 %    MCV 89.5 80.0 - 100.0 fL    MCH 31.5 26.0 - 34.0 pg    MCHC 35.2 31.0 - 36.0 g/dL    RDW 14.0 12.4 - 15.4 %    Platelets 331 416 - 872 K/uL    MPV 8.5 5.0 - 10.5 fL    Neutrophils % 68.7 %    Lymphocytes % 21.6 %    Monocytes % 6.4 %    Eosinophils % 2.2 %    Basophils % 1.1 %    Neutrophils Absolute 5.0 1.7 - 7.7 K/uL    Lymphocytes Absolute 1.6 1.0 - 5.1 K/uL    Monocytes Absolute 0.5 0.0 - 1.3 K/uL    Eosinophils Absolute 0.2 0.0 - 0.6 K/uL    Basophils Absolute 0.1 0.0 - 0.2 K/uL   Comprehensive Metabolic Panel w/ Reflex to MG   Result Value Ref Range    Sodium 138 136 - 145 mmol/L    Potassium reflex Magnesium 4.0 3.5 - 5.1 mmol/L    Chloride 99 99 - 110 mmol/L    CO2 26 21 - 32 mmol/L    Anion Gap 13 3 - 16    Glucose 112 (H) 70 - 99 mg/dL    BUN 19 7 - 20 mg/dL    Creatinine 1.2 0.9 - 1.3 mg/dL    Est, Glom Filt Rate >60 >60    Calcium 10.2 8.3 - 10.6 mg/dL    Total Protein 8.5 (H) 6.4 - 8.2 g/dL    Albumin 5.2 (H) 3.4 - 5.0 g/dL    Albumin/Globulin Ratio 1.6 1.1 - 2.2    Total Bilirubin 0.4 0.0 - 1.0 mg/dL    Alkaline Phosphatase 100 40 - 129 U/L    ALT 32 10 - 40 U/L    AST 26 15 - 37 U/L   Troponin   Result Value Ref Range    Troponin <0.01 <0.01 ng/mL   Protime-INR   Result Value Ref Range    Protime 12.3 11.7 - 14.5 sec    INR 0.92 0.87 - 1.14   Brain Natriuretic Peptide   Result Value Ref Range    Pro-BNP 24 0 - 124 pg/mL   EKG 12 Lead   Result Value Ref Range    Ventricular Rate 84 BPM    Atrial Rate 84 BPM    P-R Interval 140 ms    QRS Duration 108 ms    Q-T Interval 370 ms    QTc Calculation (Bazett) 437 ms    P Axis 32 degrees    R Axis -35 degrees    T Axis 29 degrees    Diagnosis       Normal sinus rhythmLeft axis deviationModerate voltage criteria for LVH, may be normal variantPossible Lateral infarct , age undeterminedAbnormal ECG       ED BEDSIDE ULTRASOUND:      RECENT VITALS:  BP: (!) 180/128, Temp: 98.2 °F (36.8 °C), Heart Rate: 88, Resp: 18, SpO2: 100 %     Procedures         ED Course     Nursing Notes, Past Medical Hx, Past Surgical Hx, Social Hx, Allergies, and Family Hx were reviewed. The patient was given the following medications:  Orders Placed This Encounter   Medications    iopamidol (ISOVUE-370) 76 % injection 75 mL    aspirin chewable tablet 324 mg       CONSULTS:  IP CONSULT TO PHARMACY  PHARMACY TO CHANGE BASE FLUIDS  IP CONSULT TO STROKE TEAM  IP CONSULT TO HOSPITALIST    [address] [facility] / [place] / Janet Daron is a 41 y.o. male with a history andpresentation as described above in HPI. The patient was evaluated by myself, the ED Attending Physician. On initial encounter the patient was in no acute distress with reassuring vitals and no signs of impending hemodynamic or respiratory collapse. Medical Decision Making  Briefly this is a patient who presents with concern for acute stroke.   He was rapidly evaluated by myself upon arrival and a code stroke was activated. This includes consultation with stroke team.  He presents within 2 hours of symptom onset. Based on my examination he is improving from the symptom and disease burden with current symptoms being nondebilitating. He still has intermittent word slurring and difficulty finding the right word to say. He has no other neurological deficits including symmetric strength and sensation in all 4 extremities. We will proceed with noncontrasted head CT as well as vessel imaging as well as EKG and laboratory work-up. Stroke team consulted, please see their documentation for further details. EKG without any signs of dysrhythmia or ischemia. CT imaging of the head and vasculature system without any acute concerns. Given the rapid improvement of symptoms as well as not debilitating nature I do not believe that he is a candidate for thrombolytics. This was discussed between myself, stroke team as well as the patient and we are all in agreement. We will treat likely TIA with aspirin and admission for every 4 hours neurochecks. Remaining laboratory work-up is overall reassuring. Patient was discussed with hospitalist who accepted the patient onto their service. Amount and/or Complexity of Data Reviewed  Independent Historian: spouse  Labs: ordered. Radiology: ordered. ECG/medicine tests: ordered. Risk  OTC drugs. Prescription drug management. Decision regarding hospitalization. Critical Care  Total time providing critical care: 30-74 minutes          Patient was treated with below medications:  Medications   iopamidol (ISOVUE-370) 76 % injection 75 mL (75 mLs IntraVENous Given 1/19/23 2120)   aspirin chewable tablet 324 mg (324 mg Oral Given 1/19/23 2209)     Clinical Impression     1. TIA (transient ischemic attack)        Disposition     PATIENT REFERREDTO:  No follow-up provider specified.     DISCHARGE MEDICATIONS:  New Prescriptions    No medications on file       DISPOSITION Decision To Admit 01/19/2023 10:18:20 PM          Izzy Foster MD  01/19/23 [id number]

## 2023-01-20 NOTE — PROGRESS NOTES
Hospitalist Progress Note      PCP: DANNY MEDINA [address]    Date of Admission: 1/19/2023    Chief Complaint: Expressive aphasia    Hospital Course:  44-year-old  male with medical history significant for active smoking, hyperlipidemia and hypertension who was was admitted with expressive aphasia 1/19/2023. His CT his brain and CTA head and neck were unremarkable.  stroke team recommended against TNK. His MRI brain was also unremarkable. He has a history of hypertension but he was not taking any medication. Subjective: He is back to his baseline. Denies any difficulty speaking or trouble swallowing. Moves all 4 extremities. His wife is at the bedside. He reports smoking 1 pack/day. His blood pressure is still elevated. Medications:  Reviewed    Infusion Medications   Scheduled Medications    atorvastatin  40 mg Oral Nightly    lisinopril  10 mg Oral Daily    enoxaparin  40 mg SubCUTAneous Daily    aspirin  81 mg Oral Daily    Or    aspirin  300 mg Rectal Daily     PRN Meds: labetalol, ondansetron **OR** ondansetron, polyethylene glycol    No intake or output data in the 24 hours ending 01/20/23 1327    Physical Exam Performed:    BP (!) 160/103   Pulse 80   Temp 98.2 °F (36.8 °C) (Oral)   Resp 16   Ht 6' (1.829 m)   Wt 216 lb 9.6 oz (98.2 kg)   SpO2 97%   BMI 29.38 kg/m²     General appearance: No apparent distress, appears stated age and cooperative. HEENT: Pupils equal, round, and reactive to light. Conjunctivae/corneas clear. Neck: Supple, with full range of motion. No jugular venous distention. Trachea midline. Respiratory:  Normal respiratory effort. Clear to auscultation, bilaterally without Rales/Wheezes/Rhonchi. Cardiovascular: Occasional dropped beat  Abdomen: Soft, non-tender, non-distended with normal bowel sounds. Musculoskeletal: No clubbing, cyanosis or edema bilaterally. Full range of motion without deformity.   Skin: Skin color, texture, turgor normal.  No rashes or lesions. Neurologic:  Neurovascularly intact without any focal sensory/motor deficits. Cranial nerves: II-XII intact, grossly non-focal.  Psychiatric: Alert and oriented, thought content appropriate, normal insight  Capillary Refill: Brisk, 3 seconds, normal   Peripheral Pulses: +2 palpable, equal bilaterally       Labs:   Recent Labs     01/19/23 2109   WBC 7.3   HGB 17.6*   HCT 50.0        Recent Labs     01/19/23 2109      K 4.0   CL 99   CO2 26   BUN 19   CREATININE 1.2   CALCIUM 10.2     Recent Labs     01/19/23 2109   AST 26   ALT 32   BILITOT 0.4   ALKPHOS 100     Recent Labs     01/19/23 2109   INR 0.92     Recent Labs     01/19/23 2109   TROPONINI <0.01       Urinalysis:    No results found for: [facility], BACTERIA, RBCUA, BLOODU, [address], [address]    Radiology:  MRI brain without contrast   Final Result      1. No acute intracranial abnormality. No evidence of acute infarct. 2. Minimal punctate subcortical white matter signal abnormalities in the frontal lobes. These are nonspecific and commonly associated with minimal chronic small vessel ischemic disease and some short minimal age-related degenerative change. Similar    findings are also be seen with migraine headaches. CTA HEAD NECK W CONTRAST   Final Result      1. No aneurysms, vascular occlusions, or intracranial stenoses identified. 2.  No significant stenosis in the extracranial vertebral or carotid arteries. CT HEAD WO CONTRAST   Final Result      1. No acute intracranial abnormality by CT criteria. 2.  Borderline low lying cerebral tonsils not meeting criteria for Chiari malformation. Discussed with Dr. Ginny Klein at 9:24 PM.      XR CHEST PORTABLE   Final Result      No acute pulmonary disease.              IP CONSULT TO PHARMACY  PHARMACY TO CHANGE BASE FLUIDS  IP CONSULT TO STROKE TEAM  IP CONSULT TO HOSPITALIST  IP CONSULT TO NEUROLOGY    Assessment/Plan:    KELVIN/Alpesh Pérez [address] Problems    Diagnosis     TIA (transient ischemic attack) [G45.9]      Priority: Medium     1. TIA with expressive aphasia. Symptoms lasted for about 2 hours. CT head and CTA head and neck's without any acute abnormalities. MRI brain was also unremarkable. Appreciate neurology input. Continue aspirin 81 mg and Lipitor 40 mg. Follow-up lipid panel and A1c. Check transthoracic echo and may need event monitor on discharge. 2.  Hypertension, noncompliant  Start lisinopril 10 mg daily. Recommended to check blood pressure at home and follow-up with PCP. 3.  Occasional PVC  Repeat EKG with LVH and PVCs  Will get transthoracic echo    4. History of hyperlipidemia  Follow-up lipid panel  5. Tobacco dependency  Reports smoking 1 pack/day. Counseled for cessation. Offered nicotine replacement but declined. DVT Prophylaxis: Lovenox  Diet: ADULT DIET; Regular  Code Status: Full Code  PT/OT Eval Status: Seen by PT and did not recommended any physical therapy on discharge. Dispo -patient can be discharged today if transthoracic echo can be performed and event monitor arranged.           Ganesh Barone MD

## 2023-01-20 NOTE — PROGRESS NOTES
Clinical Pharmacy Consult Note    41 y.o. male admitted with possible CNS injury. Pharmacy has been asked by Dr. Dana Lehman to adjust all drips to normal saline as appropriate based on compatibility to avoid fluid shifts since D5 is osmotically active. The following intermittent IV drips/infusions have been adjusted to saline:  None at this time    The following medications must remain in D5W due to incompatibility with normal saline:  Amphotericin  Mycophenolate  Nitroprusside  Penicillin G Potassium    Please be aware that patient has D5W ordered as part of hypoglycemia orderset. Total IV fluid delivered to patient over last 24h: TBD tomorrow    [facility] will follow daily to ensure all new IVPBs + drips are in NS. Please call with questions.   Juanis Nesbitt, PharmD  Main Pharmacy: [id number]  1/19/2023 9:58 PM

## 2023-01-20 NOTE — PLAN OF CARE
Problem: Discharge Planning  Goal: Discharge to home or other facility with appropriate resources  Outcome: Completed     Problem: Safety - Adult  Goal: Free from fall injury  1/20/2023 1535 by Radha Skinner RN  Outcome: Completed  1/20/2023 0235 by Amarjit Vaca RN  Outcome: Progressing  Note:   Standard safety precautions implemented. Call light within reach. Bed locked and in lowest position. Pt uses call light appropriately.       Problem: Neurosensory - Adult  Goal: Achieves stable or improved neurological status  Outcome: Completed  Goal: Achieves maximal functionality and self care  Outcome: Completed

## 2023-01-20 NOTE — CARE COORDINATION
CM spoke with patient at bedside. He is from home with spouse, independent pta, no DME needs, drives self. No CM needs at this time.       Emmy Syed, RN, BSN,   4th Floor Progressive Care Unit  [phone]

## 2023-01-20 NOTE — H&P
Hospital Medicine History & Physical      PCP: DANNY MEDINA [address]    Date of Admission: 1/19/2023    Date of Service: Pt seen/examined on 1/19/2023 and Admitted to Inpatient with expected LOS greater than two midnights due to medical therapy. Chief Complaint: Word finding difficulty      History Of Present Illness:    41 y.o. male who presents to ED with complaints of word finding difficulty and dysarthria that started acutely around 7 PM on 1/19. Symptoms has persisted intermittently till 9 PM.  Currently patient states that his speech is at baseline. Denies any associated focal motor or sensory deficits, changes in vision, headache. Upon arrival in ED patient's blood pressure was elevated over 200s and has come down gradually without intervention to 180s. Patient states that usually his blood pressure runs around 120. Patient has been diagnosed with hyperlipidemia and hypertension in the past and has not taken any medications. Patient has not seen a physician for a long time. Family history of TIAs in maternal grandparents and CAD in paternal grandparents    Past Medical History:      No past medical history on file. Hypertension and hyperlipidemia  Past Surgical History:      No past surgical history on file. Medications Prior to Admission:      Prior to Admission medications    Not on File   Not on any medications at home    Allergies:  Pcn [penicillins]    Social History:      The patient currently lives at home    TOBACCO:   reports that he has been smoking cigarettes. He has been smoking an average of 1 pack per day. He has never used smokeless tobacco.  ETOH:   reports current alcohol use. E-cigarette/Vaping       Questions Responses    E-cigarette/Vaping Use     Start Date     Passive Exposure     Quit Date     Counseling Given     Comments               Family History:    Reviewed and negative in regards to presenting illness/complaint. No family history on file.     REVIEW OF SYSTEMS COMPLETED:   Pertinent positives as noted in the HPI. All other systems reviewed and negative. PHYSICAL EXAM PERFORMED:    BP (!) 180/128   Pulse 88   Temp 98.2 °F (36.8 °C) (Oral)   Resp 18   Ht 6' (1.829 m)   Wt 216 lb 9.6 oz (98.2 kg)   SpO2 100%   BMI 29.38 kg/m²     General appearance:  No apparent distress, appears stated age and cooperative. HEENT:  Normal cephalic, atraumatic without obvious deformity. Pupils equal, round, and reactive to light. Extra ocular muscles intact. Conjunctivae/corneas clear. Neck: Supple, with full range of motion. No jugular venous distention. Trachea midline. Respiratory:  Normal respiratory effort. Clear to auscultation, bilaterally without Rales/Wheezes/Rhonchi. Cardiovascular:  Regular rate and rhythm with normal S1/S2 without murmurs, rubs or gallops. Abdomen: Soft, non-tender, non-distended with normal bowel sounds. Musculoskeletal:  No clubbing, cyanosis or edema bilaterally. Full range of motion without deformity. Skin: Skin color, texture, turgor normal.  No rashes or lesions. Neurologic:  Neurovascularly intact without any focal sensory/motor deficits.  Cranial nerves: II-XII intact, grossly non-focal.  Psychiatric:  Alert and oriented, thought content appropriate, normal insight  Capillary Refill: Brisk,3 seconds, normal  Peripheral Pulses: +2 palpable, equal bilaterally       Labs:     Recent Labs     01/19/23 2109   WBC 7.3   HGB 17.6*   HCT 50.0          Recent Labs     01/19/23 2109      K 4.0   CL 99   CO2 26   BUN 19   CREATININE 1.2   CALCIUM 10.2       Recent Labs     01/19/23 2109   AST 26   ALT 32   BILITOT 0.4   ALKPHOS 100       Recent Labs     01/19/23 2109   INR 0.92       Recent Labs     01/19/23 2109   TROPONINI <0.01         Urinalysis:    No results found for: Deward Goon, BACTERIA, RBCUA, BLOODU, [address], [address]    Radiology:     CXR: I have reviewed the CXR with the following interpretation: No acute airspace disease  EKG:  I have reviewed the EKG with the following interpretation: Sinus rhythm, VR = 84, QTc 437, no acute ST-T changes    CTA HEAD NECK W CONTRAST   Final Result      1. No aneurysms, vascular occlusions, or intracranial stenoses identified. 2.  No significant stenosis in the extracranial vertebral or carotid arteries. CT HEAD WO CONTRAST   Final Result      1. No acute intracranial abnormality by CT criteria. 2.  Borderline low lying cerebral tonsils not meeting criteria for Chiari malformation. Discussed with Dr. Georgina Godoy at 9:24 PM.      XR CHEST PORTABLE   Final Result      No acute pulmonary disease. Consults:    IP CONSULT TO PHARMACY  PHARMACY TO CHANGE BASE FLUIDS  IP CONSULT TO STROKE TEAM  IP CONSULT TO HOSPITALIST  IP CONSULT TO NEUROLOGY    ASSESSMENT:    Active Hospital Problems    Diagnosis Date Noted    TIA (transient ischemic attack) [G45.9] 01/19/2023     Priority: Medium   #Expressive aphasia-resolved. CT head and CTA head and neck without acute abnormalities  #Hypertensive emergency, blood pressure autoregulated down without intervention  #History of hyperlipidemia, currently not on treatment    PLAN:  -Neurochecks every 4 hourly  -Patient received aspirin 324 mg in ED  -Will continue aspirin 81 mg daily  -Check lipid panel and A1c  -High intensity statins, patient refused until lab work is available  -Blood pressure control, may need oral antihypertensives  -MRI brain without contrast  -Neurology consult  -Supportive therapy    DVT Prophylaxis: Lovenox  Diet: Diet NPO bedside swallow evaluation and start on diet as ordered  Code Status: Full Code    PT/OT Eval Status: As tolerated    Dispo -GMF with telemetry       Salas Holly MD    Thank you DANNY MEDINA [address] for the opportunity to be involved in this patient's care. If you have any questions or concerns please feel free to contact me at [phone].

## 2023-01-20 NOTE — CONSULTS
Vascular Neurology Telemedicine Consult    Date of visit: 2023  Patient Name: Kal Benavides  MRN:  [id number]  :  1975     STROKE TIMELINE  Last Known Well: 200  ED arrival time:   Thrombolytic bolus time: n/a  DTN (minutes): n/a  Most Recent NIH Stroke Scale: 0    Abbreviated History of Present Illness     Briefly, Kal Benavides is a 41 y.o. male w/ no pmhx who developed sudden onset word finding difficulty/slight slurring of speech at 1900. He noted fumbling over his words and then said he felt like he had \"a panic attack\" after this. Since presenting to the ED his sx have gradually improved to near normal.    Home Medications      No medications    Allergies     Pcn [penicillins]      Vitals     Vitals:    23   BP: (!) 229/126   Pulse: (!) 104   Resp: 18   Temp: 98.2 °F (36.8 °C)   TempSrc: Oral   SpO2: 100%   Weight: 216 lb 9.6 oz (98.2 kg)   Height: 6' (1.829 m)       Neurologic Exam     NIH Stroke Scale:                                 Time of Exam:     1a  Level of consciousness: 0=alert; keenly responsive   1b. LOC questions:  0=Performs both tasks correctly   1c. LOC commands: 0=Performs both tasks correctly   2. Best Gaze: 0=normal   3. Visual: 0=No visual loss   4. Facial Palsy: 0=Normal symmetric movement   5a. Motor left arm: 0=No drift, limb holds 90 (or 45) degrees for full 10 seconds   5b. Motor right arm: 0=No drift, limb holds 90 (or 45) degrees for full 10 seconds   6a. motor left le=No drift, limb holds 90 (or 45) degrees for full 10 seconds   6b  Motor right le=No drift, limb holds 90 (or 45) degrees for full 10 seconds   7. Limb Ataxia: 0=Absent   8. Sensory: 0=Normal; no sensory loss   9. Best Language:  0=No aphasia, normal   10. Dysarthria: 0=Normal   11.  Extinction and Inattention: 0=No abnormality    Total:   0     Additional Pertinent Exam Features:  One or two word finding errors over entire conversation, not enough to score as aphasia. Labs     I personally reviewed all recent labs. The pertinent labs which related to this visit are as follows:     Labs pending    Imaging and Diagnostic Studies     I personally reviewed the following imaging - my impression is as follows:    CTH demonstrated no hemorrhage or obvious acute ischemia  CTA demonstrated no LVO or flow limiting stenosis      Assessment & Plan/Recommendations      This is a 42 yo who developed transient word finding difficulty +/-dysarthria in the setting of significant HTN. By the time of my evaluation on telemed his sx were nearly resolved and certainly non-disabling. For this reason, he is not a candidate for thrombolysis.  Given his concomittant elevated BP's it is relativley likely that this represented a TIA or mild stroke and would triage him as such for now    Recommendations:  - Admit for overnight observation, MRI, and local neurology consult  - Would give asa 325 once now  - Allow for BP autoregulations overnight w/ SBP goal <220, DBP goal <110    Harper Ramirez DO   Stroke Team  Vascular Neurology Fellow

## 2023-01-20 NOTE — PROGRESS NOTES
Patient is A&O x4; VSS on room air. Workup testing complete and patient ready for discharge. Education provided and all questions answered.

## 2023-01-20 NOTE — PLAN OF CARE
Problem: Safety - Adult  Goal: Free from fall injury  Outcome: Progressing  Note:   Standard safety precautions implemented. Call light within reach. Bed locked and in lowest position. Pt uses call light appropriately.

## 2023-01-20 NOTE — PROGRESS NOTES
4 Eyes Admission Assessment      I agree as the admission nurse that 2 RN's have performed a thorough Head to Toe Skin Assessment on the patient. ALL assessment sites listed below have been assessed on admission. Areas assessed by both nurses:   [x]   Head, Face, and Ears   [x]   Shoulders, Back, and Chest  [x]   Arms, Elbows, and Hands   [x]   Coccyx, Sacrum, and Ischium  [x]   Legs, Feet, and Heels                        Does the Patient have Skin Breakdown?   No         Anastacio Prevention initiated:  No   Wound Care Orders initiated:  No      [facility] nurse consulted for Pressure Injury (Stage 3,4, Unstageable, DTI, NWPT, and Complex wounds) or Anastacio score 18 or lower:  No       Nurse 1 eSignature: Electronically signed by Georgia Morales RN on 1/20/23 at 12:37 AM EST     **SHARE this note so that the co-signing nurse is able to place an eSignature**     Nurse 2 eSignature: Electronically signed by Hardeep Whittington RN on 1/20/23 at 12:59 AM EST

## 2023-01-21 LAB
ESTIMATED AVERAGE GLUCOSE: 108.3 MG/DL
HBA1C MFR BLD: 5.4 %

## 2023-01-23 NOTE — PROGRESS NOTES
Physician Progress Note      Desean Avendano  CSN #:                  [id number]  :                       1975  ADMIT DATE:       2023 8:50 PM  [address] DATE:        2023 4:18 PM  RESPONDING  PROVIDER #:        Andree Thomas MD          QUERY TEXT:    Pt admitted -  with TIA with expressive aphasia and noted HTN   emergency. Noted documentation of \"transient word finding difficulties in the   setting of hypertensive emergency\" on  by ordered Neuro consultant. If   possible, please document:    The medical record reflects the following:  Risk Factors: HTN emergency  Clinical Indicators: BP on admit: 229/126- 197/118. Per Neuro c/s \"transient   word finding difficulties in the setting of hypertensive emergency'. Per H&P    \"Hypertensive emergency; diagnosed with hyperlipidemia and hypertension in the   past and has not taken any medications\"  Treatment: MRI/ CT/ CTA, Statin, ASA, Lisinopril, Neuro c/s  Options provided:  -- TIA symptoms due to Hypertensive emergency confirmed present on admission  -- TIA symptoms due to hypertensive emergency ruled out, but due to, Please   specify  -- Other - I will add my own diagnosis  -- Disagree - Not applicable / Not valid  -- Disagree - Clinically unable to determine / Unknown  -- Refer to Clinical Documentation Reviewer    PROVIDER RESPONSE TEXT:    The diagnosis of TIA symptoms due to Hypertensive emergency was confirmed as   present on admission. Query created by:  Gill Mayes on 2023 12:40 PM      Electronically signed by:  Andree Thomas MD 2023 3:48 PM